# Patient Record
(demographics unavailable — no encounter records)

---

## 2025-04-01 NOTE — HEALTH RISK ASSESSMENT
[No falls in past year] : Patient reported no falls in the past year [0] : 2) Feeling down, depressed, or hopeless: Not at all (0) [Patient/Caregiver not ready to engage] : , patient/caregiver not ready to engage [[id number]] : 0 [Change in mental status noted] : No change in mental status noted [Language] : denies difficulty with language [Behavior] : denies difficulty with behavior [Learning/Retaining New Information] : denies difficulty learning/retaining new information [Handling Complex Tasks] : denies difficulty handling complex tasks [Reasoning] : denies difficulty with reasoning [Spatial Ability and Orientation] : denies difficulty with spatial ability and orientation

## 2025-04-01 NOTE — HISTORY OF PRESENT ILLNESS
[FreeTextEntry1] : The patient is here for a routine visit.   She also needs a medical clearance for planned ANUSHKA for uterine prolapse. [de-identified] : She has a bothersome uterine prolapse and she is going for a ANUSHKA on 4/8/25 at [facility] by Dr. Gabino White.  She otherwise feels well.  She exercises regularly and she has very good exercise tolerance.  She can walk several blocks with no difficulty.  No chest pain or dyspnea.  No orthopnea or leg edema.

## 2025-04-01 NOTE — ASSESSMENT
[FreeTextEntry1] : The patient is at low medical risk for the planned procedure.  She has no symptoms to suggest cardiac disease.  The BP is fine at 125/80.  The EKG is normal.  See the clearance form for more information.  She will have the required CXR.  She will avoid aspirin, NSAIDS, vitamins and Alendronate starting now until after the surgery.  Routine DVT prophylaxis advised as per the surgeon.  She can use an incentive spirometer postoperatively.  She will go for a routine mammogram and DEXA.  Colonoscopy fine 6/21.  She sees a dermatologist and ophthalmologist.  Check lipids on Rosuvastatin.  Discussed diet and exercise.  Check a hgBa1c which was 5.9.  The new COVID-19 vaccine was discussed.  S/p Shingrix.  She will return later for Prevnar 20.